# Patient Record
Sex: FEMALE | Race: WHITE | URBAN - METROPOLITAN AREA
[De-identification: names, ages, dates, MRNs, and addresses within clinical notes are randomized per-mention and may not be internally consistent; named-entity substitution may affect disease eponyms.]

---

## 2017-05-26 ENCOUNTER — TRANSFERRED RECORDS (OUTPATIENT)
Dept: HEALTH INFORMATION MANAGEMENT | Facility: CLINIC | Age: 82
End: 2017-05-26

## 2017-05-30 ENCOUNTER — NURSING HOME VISIT (OUTPATIENT)
Dept: GERIATRICS | Facility: CLINIC | Age: 82
End: 2017-05-30
Payer: COMMERCIAL

## 2017-05-30 VITALS
OXYGEN SATURATION: 97 % | RESPIRATION RATE: 16 BRPM | BODY MASS INDEX: 25.52 KG/M2 | DIASTOLIC BLOOD PRESSURE: 90 MMHG | HEIGHT: 65 IN | TEMPERATURE: 97.4 F | SYSTOLIC BLOOD PRESSURE: 125 MMHG | HEART RATE: 80 BPM | WEIGHT: 153.2 LBS

## 2017-05-30 DIAGNOSIS — D64.9 ANEMIA, UNSPECIFIED TYPE: ICD-10-CM

## 2017-05-30 DIAGNOSIS — I25.10 CORONARY ARTERY DISEASE INVOLVING NATIVE CORONARY ARTERY OF NATIVE HEART WITHOUT ANGINA PECTORIS: ICD-10-CM

## 2017-05-30 DIAGNOSIS — I50.32 CHRONIC DIASTOLIC CONGESTIVE HEART FAILURE (H): ICD-10-CM

## 2017-05-30 DIAGNOSIS — E87.1 HYPONATREMIA: Primary | ICD-10-CM

## 2017-05-30 DIAGNOSIS — I10 ESSENTIAL HYPERTENSION: ICD-10-CM

## 2017-05-30 DIAGNOSIS — R41.89 COGNITIVE IMPAIRMENT: ICD-10-CM

## 2017-05-30 DIAGNOSIS — Z86.73 HISTORY OF CVA (CEREBROVASCULAR ACCIDENT): ICD-10-CM

## 2017-05-30 DIAGNOSIS — R53.81 PHYSICAL DECONDITIONING: ICD-10-CM

## 2017-05-30 PROCEDURE — 99207 ZZC CDG-CORRECTLY CODED, REVIEWED AND AGREE: CPT | Performed by: NURSE PRACTITIONER

## 2017-05-30 PROCEDURE — 99310 SBSQ NF CARE HIGH MDM 45: CPT | Performed by: NURSE PRACTITIONER

## 2017-05-30 RX ORDER — ALBUTEROL SULFATE 1.25 MG/3ML
1 SOLUTION RESPIRATORY (INHALATION) EVERY 6 HOURS PRN
COMMUNITY
End: 2017-06-14

## 2017-05-30 RX ORDER — ASPIRIN 81 MG/1
81 TABLET, CHEWABLE ORAL DAILY
COMMUNITY

## 2017-05-30 RX ORDER — BENZOCAINE/MENTHOL 6 MG-10 MG
LOZENGE MUCOUS MEMBRANE 2 TIMES DAILY
COMMUNITY
End: 2017-05-31

## 2017-05-30 RX ORDER — FLUTICASONE PROPIONATE 50 MCG
2 SPRAY, SUSPENSION (ML) NASAL DAILY
COMMUNITY
End: 2017-06-01

## 2017-05-30 RX ORDER — POTASSIUM CHLORIDE 750 MG/1
10 CAPSULE, EXTENDED RELEASE ORAL DAILY
COMMUNITY

## 2017-05-30 RX ORDER — POLYETHYLENE GLYCOL 1450
1 POWDER (GRAM) MISCELLANEOUS DAILY PRN
COMMUNITY
End: 2017-06-14

## 2017-05-30 RX ORDER — ASCORBIC ACID 500 MG
500 TABLET ORAL DAILY
COMMUNITY

## 2017-05-30 RX ORDER — FERROUS GLUCONATE 324(38)MG
324 TABLET ORAL
COMMUNITY

## 2017-05-30 NOTE — PROGRESS NOTES
[facility]  PRIMARY CARE PROVIDER AND CLINIC:  [facility] [address] / [facility] [address]  Chief Complaint   Patient presents with     Hospital F/U       HPI:    Pebbles Moulton is a 82 year old  (1935),admitted to the [facility] from [facility].  Hospital stay 05/26/2017 through 05/29/2017.  Admitted to this facility for  rehab, medical management and nursing care.    She has a history of CHF and CAD with stent placement earlier this month and was hospitalized from a different tcu with the feeling that a pill was stuck in her throat. No choking episode reported. CT scan was negative for neck abnormalities. In the ED, Na level was 111, down from 138 on 5/22/2017. Her daughter had reported that they were pushing fluids to make sure she was hydrated. Cardiology consulted and IV lasix was given. Fluid restriction started. Na improved to 132 at discharge. IV lasix was transitioned to oral. Hgb was 7.5 at discharge.   Recent ECHO showed EF 20% with diffuse hypokinesis.     She is a fair historian. Message left for her daughter Parul Preston.   For some reason, care everywhere is unavailable.       Current issues are:         Hyponatremia-reports fatigue, otherwise feeling well. Reports an occasional dry cough. The feeling that a pill is stuck in her throat resolved inpatient.   Chronic diastolic congestive heart failure (H)-denies shortness of breath or chest pain.   Anemia, unspecified type-reports she was scheduled for a colonoscopy but it was cancelled due to cardiac issues.   Coronary artery disease involving native coronary artery of native heart without angina pectoris  History of CVA (cerebrovascular accident)-records indicate multiple CVAs/TIA  Essential HTN-BPs: 125/90, 117/69, 149/76   HR: 78-83  Cognitive impairment-fair historian re: medical history. No services at home, manages her own meds.   Physical deconditioning-ambulating short  distances with walker and assist. Requires assist of 1 with cares.     CODE STATUS/ADVANCE DIRECTIVES DISCUSSION:   CPR/Full code   Patient's living condition: lives alone [facility]    ALLERGIES:Review of patient's allergies indicates no known allergies.  PAST MEDICAL HISTORY:  has a past medical history of Anemia; CAD (coronary artery disease); CHF (congestive heart failure) (H); CVA (cerebral vascular accident) (H); Essential hypertension; Ischemic cardiomyopathy; and TIA (transient ischemic attack).  PAST SURGICAL HISTORY:  has no past surgical history on file.  FAMILY HISTORY: family history is not on file.  SOCIAL HISTORY:      Post Discharge Medication Reconciliation Status: discharge medications reconciled, continue medications without change.  Current Outpatient Prescriptions   Medication Sig Dispense Refill     ACETAMINOPHEN PO Take 325-650 mg by mouth every 4 hours as needed for pain       albuterol (ACCUNEB) 1.25 MG/3ML nebulizer solution Take 1 vial by nebulization every 6 hours as needed for shortness of breath / dyspnea or wheezing       ascorbic acid 500 MG TABS Take 500 mg by mouth daily       aspirin 81 MG chewable tablet Take 81 mg by mouth daily       ATORVASTATIN CALCIUM PO Take 40 mg by mouth every evening       CARVEDILOL PO Take 6.25 mg by mouth 2 times daily (with meals)       CLOPIDOGREL BISULFATE PO Take 75 mg by mouth daily       ferrous gluconate (FERGON) 324 (38 FE) MG tablet Take 324 mg by mouth 3 times daily (with meals)       fluticasone (FLONASE) 50 MCG/ACT spray Spray 2 sprays into both nostrils daily       guaiFENesin (ROBITUSSIN) 20 mg/mL SOLN solution Take 20 mLs by mouth every 4 hours as needed for cough Until 5/30 at 23:59       hydrocortisone (CORTAID) 1 % cream Apply topically 2 times daily Apply to affected area.       FUROSEMIDE PO Take 40 mg by mouth daily       LISINOPRIL PO Take 2.5 mg by mouth daily       OMEPRAZOLE PO Take 20 mg by mouth daily 1 hour before meal.    "    Polyethylene Glycol 1450 POWD Take 1 packet by mouth daily       potassium chloride (MICRO-K) 10 MEQ CR capsule Take 10 mEq by mouth 2 times daily         ROS:  10 point ROS of systems including Constitutional, Eyes, Respiratory, Cardiovascular, Gastroenterology, Genitourinary, Integumentary, Muscularskeletal, Psychiatric were all negative except for pertinent positives noted in my HPI.    Exam:  /90  Pulse 80  Temp 97.4  F (36.3  C)  Resp 16  Ht 5' 5\" (1.651 m)  Wt 153 lb 3.2 oz (69.5 kg)  SpO2 97%  BMI 25.49 kg/m2  GENERAL APPEARANCE:  Alert, in no distress  ENT:  Mouth and posterior oropharynx normal, moist mucous membranes, normal hearing acuity  EYES:  EOM normal, conjunctiva and lids normal, PERRL  NECK:  No adenopathy,masses or thyromegaly  RESP:  respiratory effort and palpation of chest normal, lungs clear to auscultation , no respiratory distress  CV:  Palpation and auscultation of heart done , regular rate and rhythm, no murmur, rub, or gallop, no edema, +2 pedal pulses  ABDOMEN:  normal bowel sounds, soft, nontender, no hepatosplenomegaly or other masses  M/S:   gait steady with walker. FAGAN with good strength. No joint inflammation  SKIN:  no rashes or open areas  PSYCH:  oriented X 3, memory impaired     Lab/Diagnostic data:  Labs from [facility]    5/29/17:  Creatinine 0.83  Glucose 115  BICARB 28  Chloride 98  K  4.0  Sodium 132  BUN  15  CA  8.9  GFR  >60    5/28/17:  Sodium 129  Potassium 3.8  BUN  15  Creatinine 0.91    5/27/17:  Sodium 128  Potassium 3.6  BUN  <10  Creatinine 0.81    5/26/17:  Creatinine 0.81  Glucose 146  GLWB  127  BICARB 22  Chloride 84  K  4.0  Sodium 111  BUN  14  CA  8.1  GFR  >60  Hgb  7.5  Platelet 262    ASSESSMENT / PLAN:  (E87.1) Hyponatremia  (primary encounter diagnosis)  (I50.32) Chronic diastolic congestive heart failure (H)  Comment: appears compensated. Excessive free water intake contributed to hyponatremia   Plan: continue lasix. Daily " weight. BMP. Message left for daughter Parul Preston.     (D64.9) Anemia, unspecified type  Comment: etiology unclear and no records available. Appears she was scheduled for colonoscopy, which was cancelled.   Plan: Hgb tomorrow. Continue ferrous gluconate. Discuss with daughter.     (I25.10) CAD  Comment: s/p recent stent placement earlier this month.   Plan: continue Plavix, ASA, carvedilol, lisinopril. Cardiology follow up as scheduled.     (Z86.73) History of CVA (cerebrovascular accident)  Comment: history of multiple CVAs/TIAs  Plan: continue ASA. Therapies, including cognitive testing.     (I10) Essential hypertension  Comment: controlled  Plan: continue carvedilol, lisinopril, lasix. Monitor VS. Follow BMP    (R41.89) Cognitive impairment  Comment: appears to have mild deficits, although she may not be back to her baseline  Plan: cognitive testing per therapies.     (R53.81) Physical deconditioning  Comment: related to recurrent hospitalizations and multiple comorbidities  Plan: PHYSICAL THERAPY/OT. Her goal is to return to her apt.           Information reviewed:  Medications, vital signs, orders, nursing notes, problem list, hospital information.     Electronically signed by:  MICHAEL Herrera CNP

## 2017-05-31 ENCOUNTER — TRANSFERRED RECORDS (OUTPATIENT)
Dept: HEALTH INFORMATION MANAGEMENT | Facility: CLINIC | Age: 82
End: 2017-05-31

## 2017-05-31 PROBLEM — E87.1 HYPONATREMIA: Status: ACTIVE | Noted: 2017-05-31

## 2017-05-31 PROBLEM — R53.81 PHYSICAL DECONDITIONING: Status: ACTIVE | Noted: 2017-05-31

## 2017-05-31 PROBLEM — Z86.73 HISTORY OF CVA (CEREBROVASCULAR ACCIDENT): Status: ACTIVE | Noted: 2017-05-31

## 2017-05-31 PROBLEM — R41.89 COGNITIVE IMPAIRMENT: Status: ACTIVE | Noted: 2017-05-31

## 2017-05-31 LAB
ANION GAP SERPL CALCULATED.3IONS-SCNC: 7 MMOL/L (ref 3–14)
BUN SERPL-MCNC: 20 MG/DL (ref 7–30)
CALCIUM SERPL-MCNC: 8.8 MG/DL (ref 8.5–10.1)
CHLORIDE SERPLBLD-SCNC: 101 MMOL/L (ref 94–109)
CO2 SERPL-SCNC: 28 MMOL/L (ref 20–32)
CREAT SERPL-MCNC: 0.92 MG/DL (ref 0.52–1.04)
GFR SERPL CREATININE-BSD FRML MDRD: 59 ML/MIN/1.73M2
GLUCOSE SERPL-MCNC: 104 MG/DL (ref 70–99)
HEMOGLOBIN: 7.1 G/DL (ref 11.7–15.7)
POTASSIUM SERPL-SCNC: 4.3 MMOL/L (ref 3.4–5.3)
SODIUM SERPL-SCNC: 136 MMOL/L (ref 133–144)

## 2017-06-01 ENCOUNTER — NURSING HOME VISIT (OUTPATIENT)
Dept: GERIATRICS | Facility: CLINIC | Age: 82
End: 2017-06-01
Payer: COMMERCIAL

## 2017-06-01 VITALS
DIASTOLIC BLOOD PRESSURE: 70 MMHG | SYSTOLIC BLOOD PRESSURE: 135 MMHG | OXYGEN SATURATION: 100 % | TEMPERATURE: 97 F | RESPIRATION RATE: 16 BRPM | HEART RATE: 77 BPM

## 2017-06-01 VITALS
HEART RATE: 77 BPM | BODY MASS INDEX: 24.06 KG/M2 | RESPIRATION RATE: 16 BRPM | SYSTOLIC BLOOD PRESSURE: 135 MMHG | TEMPERATURE: 97 F | DIASTOLIC BLOOD PRESSURE: 70 MMHG | WEIGHT: 144.6 LBS | OXYGEN SATURATION: 100 %

## 2017-06-01 DIAGNOSIS — E87.1 HYPONATREMIA: ICD-10-CM

## 2017-06-01 DIAGNOSIS — D64.9 ANEMIA, UNSPECIFIED TYPE: Primary | ICD-10-CM

## 2017-06-01 DIAGNOSIS — I10 ESSENTIAL HYPERTENSION: ICD-10-CM

## 2017-06-01 DIAGNOSIS — I25.5 ISCHEMIC CARDIOMYOPATHY: ICD-10-CM

## 2017-06-01 DIAGNOSIS — Z71.89 ADVANCED DIRECTIVES, COUNSELING/DISCUSSION: ICD-10-CM

## 2017-06-01 DIAGNOSIS — R41.89 COGNITIVE IMPAIRMENT: ICD-10-CM

## 2017-06-01 DIAGNOSIS — R53.81 PHYSICAL DECONDITIONING: ICD-10-CM

## 2017-06-01 DIAGNOSIS — I50.32 CHRONIC DIASTOLIC CONGESTIVE HEART FAILURE (H): ICD-10-CM

## 2017-06-01 PROCEDURE — 99309 SBSQ NF CARE MODERATE MDM 30: CPT | Performed by: NURSE PRACTITIONER

## 2017-06-01 RX ORDER — AMOXICILLIN 250 MG
2 CAPSULE ORAL AT BEDTIME
COMMUNITY
End: 2017-06-14

## 2017-06-01 NOTE — PROGRESS NOTES
[facility]    Chief Complaint   Patient presents with     RECHECK       HPI:    Pebbles Moulton is a 82 year old  (1935), who is being seen today for an episodic care visit at [facility].  HPI information obtained from: facility chart records, facility staff, patient report and Care Everywhere Epic chart review.  She came to this facility 5/29/2017 for short term rehab and medical management following hospitalization for hyponatremia, in the setting of CHF and excessive water ingestion. She was in the ED for evaluation of feeling that a pill was stuck in her throat and was found to have Na 111. Cardiology consulted and IV lasix given. Fluid restriction started. Na 132 at discharge. IV lasix transitioned to oral. Hgb 7.5 at discharge.   She was also hospitalized at [facility] earlier this month for CAD and underwent stent placement. ECHO: EF 20% with diffuse hypokinesis.       Today's concerns are:      Anemia, unspecified type-Hgb dropped to 7.1 yesterday, today is 7.9. Denies signs of bleeding. Had 1 negative hemoccult today. Denies feeling dizzy or lightheaded.   Hyponatremia-Na   Chronic diastolic congestive heart failure (H)-recorded weight is down 9 lbs in 2 days, so probably not accurate. Good appetite.  Denies cough, shortness of breath or chest pain.   Ischemic cardiomyopathy  Essential hypertension-BPs: 135/70, 144/73, 113/63   HR: 68-83  Cognitive impairment-speech therapist indicates fairly good memory, but deficits with higher level tasks. CPT is in progress.   Physical deconditioning-ambulating short distances with walker and assist. Requires assist of 1 with cares.     Her daughter Parul Preston is here and reports patient's functional status and cognition have not been back to baseline since hospitalization earlier this month. [place] in [place] and able to manage her own meds. Patient was anemic prior to having stent placed and colonoscopy was scheduled, but  cancelled. Family feels that a colonoscopy would be too hard for patient at this time, daughter asks if anything less invasive can be done.     Per care everywhere records:  She was seen in the [facility] ED 4/22/2017 with rectal bleeding after having an enema the day  before for constipation. Hgb ws 9.4 at the time, baseline Hgb around 10.5.   Hospitalized 5/1-5/8/2017 for CAD with angina and underwent stent placement. EF 20%. The day after the procedure,  5/2/2017, she was noted to have right arm weakness, likely due to small stroke. She has a history of multiple small acute and subacute infarcts with residual right sided weakness. Hgb was 7.5 and work up was consistent with iron deficiency. Iron supplement was started and follow up with PCP recommended.         ALLERGIES: Review of patient's allergies indicates no known allergies.  Past Medical, Surgical, Family and Social History reviewed and updated in [facility].    Current Outpatient Prescriptions   Medication Sig Dispense Refill     senna-docusate (SENOKOT-S;PERICOLACE) 8.6-50 MG per tablet Take 2 tablets by mouth At Bedtime Hold for loose stools        ACETAMINOPHEN PO Take 650 mg by mouth every 4 hours as needed for pain        albuterol (ACCUNEB) 1.25 MG/3ML nebulizer solution Take 1 vial by nebulization every 6 hours as needed for shortness of breath / dyspnea or wheezing       ascorbic acid 500 MG TABS Take 500 mg by mouth daily       aspirin 81 MG chewable tablet Take 81 mg by mouth daily       ATORVASTATIN CALCIUM PO Take 40 mg by mouth every evening       CARVEDILOL PO Take 6.25 mg by mouth 2 times daily (with meals)       CLOPIDOGREL BISULFATE PO Take 75 mg by mouth daily       ferrous gluconate (FERGON) 324 (38 FE) MG tablet Take 324 mg by mouth 3 times daily (with meals)       guaiFENesin (ROBITUSSIN) 20 mg/mL SOLN solution Take 10 mLs by mouth every 4 hours as needed for cough Until 5/30 at 23:59        FUROSEMIDE PO Take 40 mg by mouth daily        LISINOPRIL PO Take 2.5 mg by mouth daily       OMEPRAZOLE PO Take 20 mg by mouth daily 1 hour before meal.       Polyethylene Glycol 1450 POWD Take 1 packet by mouth daily       potassium chloride (MICRO-K) 10 MEQ CR capsule Take 10 mEq by mouth 2 times daily       Medications reviewed:  Medications reconciled to facility chart and changes were made to reflect current medications as identified as above med list. Below are the changes that were made:   Medications stopped since last EPIC medication reconciliation:   Medications Discontinued During This Encounter   Medication Reason     fluticasone (FLONASE) 50 MCG/ACT spray Medication Reconciliation Clean Up       Medications started since last [facility] medication reconciliation:  Orders Placed This Encounter   Medications     senna-docusate (SENOKOT-S;RALEIGH COLACE) 8.6-50 MG per tablet     Sig: Take 1 tablet by mouth At Bedtime Hold for loose stools     REVIEW OF SYSTEMS:  10 point ROS of systems including Constitutional, Eyes, Respiratory, Cardiovascular, Gastroenterology, Genitourinary, Integumentary, Muscularskeletal, Psychiatric were all negative except for pertinent positives noted in my HPI.    Physical Exam:  /70  Pulse 77  Temp 97  F (36.1  C)  Resp 16  SpO2 100%  GENERAL APPEARANCE:  Alert, in no distress  ENT:  Mouth and posterior oropharynx normal, moist mucous membranes, normal hearing acuity  EYES:  EOM normal, conjunctiva and lids normal  NECK:  No adenopathy,masses or thyromegaly  RESP:  respiratory effort and palpation of chest normal, lungs clear to auscultation , no respiratory distress  CV:  Palpation and auscultation of heart done , regular rate and rhythm, no murmur, no edema, +2 pedal pulses  ABDOMEN:  soft, nontender, no hepatosplenomegaly or other masses  M/S:   gait steady with walker. FAGAN with good strength. No joint inflammation  SKIN:  no rashes or open areas  PSYCH:  oriented X 3, memory impaired     Recent Labs:    6/1/2017: Hgb  7.9    Last Basic Metabolic Panel:  Lab Results   Component Value Date     05/31/2017      Lab Results   Component Value Date    POTASSIUM 4.3 05/31/2017     Lab Results   Component Value Date    CHLORIDE 101 05/31/2017     Lab Results   Component Value Date    SAVANNAH 8.8 05/31/2017     Lab Results   Component Value Date    CO2 28 05/31/2017     Lab Results   Component Value Date    BUN 20 05/31/2017     Lab Results   Component Value Date    CR 0.92 05/31/2017     Lab Results   Component Value Date     05/31/2017     Lab Results   Component Value Date    HGB 7.1 05/31/2017       ASSESSMENT / PLAN:  (D.9) Anemia, unspecified type  (primary encounter diagnosis)  Comment: Hgb improved. No signs of active bleeding. Daughter does not want colonoscopy rescheduled at this time.   Plan: continue ferrous gluconate (consider reducing dose to bid). Recheck Hgb 6/5/2017 . Iron studies pending. Hemoccult stools. Refer to GI if needed.     (E.1) Hyponatremia  Comment: improved.   Plan: BMP 6/5/2017. Consider increasing amount of fluid restriction-will discuss with dietician.     (I.32) Chronic diastolic congestive heart failure (H)  (I.5) Ischemic cardiomyopathy  CAD, S/pa recent stent  Comment: appears compensated with no acute issues  Plan: continue lasix, Plavix, ASA, carvedilol, lisinopril. Daily weight. Cardiology follow up next week.     (I) Essential hypertension  Comment: controlled. Renal function at baseline  Plan: continue carvedilol, lisinopril, lasix. Monitor VS. Follow BMP    (R.89) Cognitive impairment  Comment: appears to have mild to moderate deficits. She is not back to baseline, per daughter  Plan: cognitive testing in progress.     (R.81) Physical deconditioning  Comment: progressing in therapies  Plan: continue PHYSICAL THERAPY/OT. [place], but she may need services. Family aware.     (EZ.89) Advanced directives  Comment: daughter is health care agent and confirms Full Code  Plan:  POLST completed and [facility] orders updated.       Electronically signed by  MICHAEL Herrera CNP

## 2017-06-02 ENCOUNTER — NURSING HOME VISIT (OUTPATIENT)
Dept: GERIATRICS | Facility: CLINIC | Age: 82
End: 2017-06-02

## 2017-06-02 DIAGNOSIS — Z53.9 ERRONEOUS ENCOUNTER--DISREGARD: Primary | ICD-10-CM

## 2017-06-02 PROBLEM — Z71.89 ADVANCED DIRECTIVES, COUNSELING/DISCUSSION: Status: ACTIVE | Noted: 2017-06-02

## 2017-06-05 ENCOUNTER — TRANSFERRED RECORDS (OUTPATIENT)
Dept: HEALTH INFORMATION MANAGEMENT | Facility: CLINIC | Age: 82
End: 2017-06-05

## 2017-06-05 LAB
ANION GAP SERPL CALCULATED.3IONS-SCNC: 8 MMOL/L (ref 3–14)
BUN SERPL-MCNC: 16 MG/DL (ref 7–30)
CALCIUM SERPL-MCNC: 8.8 MG/DL (ref 8.5–10.1)
CHLORIDE SERPLBLD-SCNC: 103 MMOL/L (ref 94–109)
CO2 SERPL-SCNC: 26 MMOL/L (ref 20–32)
CREAT SERPL-MCNC: ABNORMAL MG/DL
GFR SERPL CREATININE-BSD FRML MDRD: 61 ML/MIN/1.73M2
GLUCOSE SERPL-MCNC: 119 MG/DL (ref 70–99)
HEMOGLOBIN: 7.7 G/DL (ref 11.7–15.7)
POTASSIUM SERPL-SCNC: 3.9 MMOL/L (ref 3.5–5.3)
SODIUM SERPL-SCNC: 137 MMOL/L (ref 133–144)

## 2017-06-06 ENCOUNTER — NURSING HOME VISIT (OUTPATIENT)
Dept: GERIATRICS | Facility: CLINIC | Age: 82
End: 2017-06-06
Payer: COMMERCIAL

## 2017-06-06 VITALS
RESPIRATION RATE: 16 BRPM | WEIGHT: 143.4 LBS | BODY MASS INDEX: 23.86 KG/M2 | TEMPERATURE: 98 F | OXYGEN SATURATION: 100 % | HEART RATE: 80 BPM | SYSTOLIC BLOOD PRESSURE: 119 MMHG | DIASTOLIC BLOOD PRESSURE: 69 MMHG

## 2017-06-06 DIAGNOSIS — R05.9 COUGH: ICD-10-CM

## 2017-06-06 DIAGNOSIS — R53.81 PHYSICAL DECONDITIONING: ICD-10-CM

## 2017-06-06 DIAGNOSIS — R41.89 COGNITIVE IMPAIRMENT: ICD-10-CM

## 2017-06-06 DIAGNOSIS — E87.1 HYPONATREMIA: Primary | ICD-10-CM

## 2017-06-06 DIAGNOSIS — I10 ESSENTIAL HYPERTENSION: ICD-10-CM

## 2017-06-06 DIAGNOSIS — I50.22 CHRONIC SYSTOLIC CONGESTIVE HEART FAILURE (H): ICD-10-CM

## 2017-06-06 DIAGNOSIS — D64.9 ANEMIA, UNSPECIFIED TYPE: ICD-10-CM

## 2017-06-06 PROCEDURE — 99207 ZZC CDG-CORRECTLY CODED, REVIEWED AND AGREE: CPT | Performed by: INTERNAL MEDICINE

## 2017-06-06 PROCEDURE — 99306 1ST NF CARE HIGH MDM 50: CPT | Performed by: INTERNAL MEDICINE

## 2017-06-12 ENCOUNTER — TRANSFERRED RECORDS (OUTPATIENT)
Dept: HEALTH INFORMATION MANAGEMENT | Facility: CLINIC | Age: 82
End: 2017-06-12

## 2017-06-12 LAB
ANION GAP SERPL CALCULATED.3IONS-SCNC: 9 MMOL/L (ref 3–14)
BUN SERPL-MCNC: 16 MG/DL (ref 7–30)
CALCIUM SERPL-MCNC: 8.9 MG/DL (ref 8.5–10.1)
CHLORIDE SERPLBLD-SCNC: 105 MMOL/L (ref 94–109)
CO2 SERPL-SCNC: 25 MMOL/L (ref 20–32)
CREAT SERPL-MCNC: 0.84 MG/DL (ref 0.52–1.04)
GFR SERPL CREATININE-BSD FRML MDRD: 65 ML/MIN/1.73M2
GLUCOSE SERPL-MCNC: 106 MG/DL (ref 70–99)
POTASSIUM SERPL-SCNC: 3.6 MMOL/L (ref 3.4–5.3)
SODIUM SERPL-SCNC: 139 MMOL/L (ref 133–144)

## 2017-06-14 ENCOUNTER — DISCHARGE SUMMARY NURSING HOME (OUTPATIENT)
Dept: GERIATRICS | Facility: CLINIC | Age: 82
End: 2017-06-14
Payer: COMMERCIAL

## 2017-06-14 VITALS
DIASTOLIC BLOOD PRESSURE: 68 MMHG | HEART RATE: 96 BPM | TEMPERATURE: 97.2 F | OXYGEN SATURATION: 99 % | SYSTOLIC BLOOD PRESSURE: 121 MMHG | BODY MASS INDEX: 23.7 KG/M2 | WEIGHT: 142.4 LBS | RESPIRATION RATE: 16 BRPM

## 2017-06-14 DIAGNOSIS — R41.89 COGNITIVE IMPAIRMENT: ICD-10-CM

## 2017-06-14 DIAGNOSIS — E87.1 HYPONATREMIA: Primary | ICD-10-CM

## 2017-06-14 DIAGNOSIS — Z86.73 HISTORY OF CVA (CEREBROVASCULAR ACCIDENT): ICD-10-CM

## 2017-06-14 DIAGNOSIS — I25.5 ISCHEMIC CARDIOMYOPATHY: ICD-10-CM

## 2017-06-14 DIAGNOSIS — R53.81 PHYSICAL DECONDITIONING: ICD-10-CM

## 2017-06-14 DIAGNOSIS — I10 ESSENTIAL HYPERTENSION: ICD-10-CM

## 2017-06-14 DIAGNOSIS — I25.10 CORONARY ARTERY DISEASE INVOLVING NATIVE CORONARY ARTERY OF NATIVE HEART WITHOUT ANGINA PECTORIS: ICD-10-CM

## 2017-06-14 DIAGNOSIS — I50.22 CHRONIC SYSTOLIC CONGESTIVE HEART FAILURE (H): ICD-10-CM

## 2017-06-14 DIAGNOSIS — D64.9 ANEMIA, UNSPECIFIED TYPE: ICD-10-CM

## 2017-06-14 PROCEDURE — 99316 NF DSCHRG MGMT 30 MIN+: CPT | Performed by: NURSE PRACTITIONER

## 2017-06-14 PROCEDURE — 99207 ZZC CDG-CORRECTLY CODED, REVIEWED AND AGREE: CPT | Performed by: NURSE PRACTITIONER

## 2017-06-14 NOTE — PROGRESS NOTES
[facility] DISCHARGE SUMMARY    PATIENT'S NAME: Pebbles Moulton  YOB: 1935  MEDICAL RECORD NUMBER:  [id number]    PRIMARY CARE PROVIDER AND CLINIC [facility] [address] [facility] [place]    CODE STATUS/ADVANCE DIRECTIVES DISCUSSION:   CPR/Full code      No Known Allergies    TRANSFERRING PROVIDERS: MICHAEL Herrera CNP, Janelle Vargas MD  DATE OF SNF ADMISSION:  May / 29 / 2017  DATE OF SNF (anticipated) DISCHARGE: Mila / 15 / 2017  DISCHARGE DISPOSITION: Non-FMG Provider   Nursing Facility: [facility]  [facility] stay 05/26/2017 to 05/29/2017.     Condition on Discharge:  Improving.  Cognitive Scores: SLUMS 21/30 and CPT 5.0/5.6    Equipment: walker    DISCHARGE DIAGNOSIS:   1. Hyponatremia    2. Anemia, unspecified type    3. Chronic systolic congestive heart failure (H)    4. Ischemic cardiomyopathy    5. Coronary artery disease involving native coronary artery of native heart without angina pectoris    6. History of CVA (cerebrovascular accident)    7. Essential hypertension    8. Cognitive impairment    9. Physical deconditioning        HPI Nursing Facility Course:  HPI information obtained from: facility chart records, facility staff, patient report and [facility] chart review.  She came to this facility  for short term rehab and medical management following hospitalization for hyponatremia, in the setting of CHF and excessive water ingestion. She was in the ED for evaluation of feeling that a pill was stuck in her throat and was found to have Na 111. Cardiology consulted and IV lasix given. Fluid restriction started. Na 132 at discharge. IV lasix transitioned to oral. Hgb 7.5 at discharge.   She was also hospitalized at [facility] earlier this month for CAD and underwent stent placement. ECHO: EF 20% with diffuse hypokinesis.     She has worked with PHYSICAL THERAPY and OT with good  results. Ambulating 300 ft with walker and supervision. Requires stand by assistance with bathing. Independent with toileting and dressing.   ASSESSMENT / PLAN:  (E87.1) Hyponatremia  (primary encounter diagnosis)  Comment: due to excess water ingestion, resolved. Fluid restriction has been relaxed to 1800 ml.   Plan: continue fluid restriction, follow up labs per PCP. Discharge home 6/15/2017. Home services and follow up appts as below.     (D64.9) Anemia, unspecified type  Comment: anemia since at least 4/2017 when she was evaluated in the [facility] ED for rectal bleeding. Work up done inpatient was consistent with iron deficiency. Colonoscopy was scheduled but cancelled by family. Hgb has improved from 7.1 to 8.5. Hemoccults negative  Plan: continue daily ferrous gluconate. Follow up labs per PCP.     (I50.22) Chronic systolic congestive heart failure (H)  (I25.5) Ischemic cardiomyopathy  (I25.10) Coronary artery disease involving native coronary artery of native heart without angina pectoris  Comment: no acute issues, fluid status compensated. Most recent Cardiology appointment was 6/14/2017.   Plan: continue ASA, statin, Plavix, carvedilol, losartan, lasix. Follow up Cardiology as scheduled.     (Z86.73) History of CVA (cerebrovascular accident)  Comment: mild right foot drag has improved with therapies. She had Neurology follow up this week.   Plan: continue ASA. Follow up [facility] Neurology as scheduled.     (I10) Essential hypertension  Comment: controlled with BPs: 121/68, 122/74, 117/74   HR: 81-96  Plan: continue losartan, lasix, carvedilol. Follow up with PCP    (R41.89) Cognitive impairment  Comment: mild deficits. Family very involved and assists with med set up.   Plan: as above    (R53.81) Physical deconditioning  Comment: progress in therapies as above  Plan: home therapies for ongoing strengthening, gait training and ADL safety      PAST MEDICAL HISTORY:  has a past medical history of Anemia; CAD  (coronary artery disease); CHF (congestive heart failure) (H); CVA (cerebral vascular accident) (H); Essential hypertension; Ischemic cardiomyopathy; and TIA (transient ischemic attack).    DISCHARGE MEDICATIONS:  Current Outpatient Prescriptions   Medication Sig Dispense Refill     LOSARTAN POTASSIUM PO Take 12.5 mg by mouth daily       ACETAMINOPHEN PO Take 650 mg by mouth every 4 hours as needed for pain        ascorbic acid 500 MG TABS Take 500 mg by mouth daily       aspirin 81 MG chewable tablet Take 81 mg by mouth daily       ATORVASTATIN CALCIUM PO Take 40 mg by mouth every evening       CARVEDILOL PO Take 6.25 mg by mouth 2 times daily (with meals)       CLOPIDOGREL BISULFATE PO Take 75 mg by mouth daily       ferrous gluconate (FERGON) 324 (38 FE) MG tablet Take 324 mg by mouth daily (with breakfast)        guaiFENesin (ROBITUSSIN) 20 mg/mL SOLN solution Take 10 mLs by mouth 4 times daily as needed for cough Until 5/30 at 23:59        FUROSEMIDE PO Take 40 mg by mouth daily       OMEPRAZOLE PO Take 20 mg by mouth daily 1 hour before meal.       potassium chloride (MICRO-K) 10 MEQ CR capsule Take 10 mEq by mouth daily          MEDICATION CHANGES/RATIONALE:   Ferrous gluconate decreased from tid to daily.   Lisinopril discontinued and losartan started per Cardiology due to cough.   Albuterol neb, miralax and senna discontinued-not used.   Controlled medications sent with patient: not applicable/none     ROS:    10 point ROS of systems including Constitutional, Eyes, Respiratory, Cardiovascular, Gastroenterology, Genitourinary, Integumentary, Muscularskeletal, Psychiatric were all negative except for pertinent positives noted in my HPI.    Physical Exam:   Vitals: /68  Pulse 96  Temp 97.2  F (36.2  C)  Resp 16  Wt 142 lb 6.4 oz (64.6 kg)  SpO2 99%  BMI 23.7 kg/m2  BMI= Body mass index is 23.7 kg/(m^2).    GENERAL APPEARANCE:  Alert, in no distress  RESP:  respiratory effort and palpation of chest  normal, lungs clear to auscultation , no respiratory distress  CV:  Palpation and auscultation of heart done , regular rate and rhythm, no murmur, no edema, +2 pedal pulses  ABDOMEN:  soft, nontender, no hepatosplenomegaly or other masses  M/S:   gait steady with walker, mild right foot drag, FAGAN with good strength. No joint inflammation  SKIN:  no rashes or open areas  PSYCH:  oriented X 3, memory impaired, mood and affect normal    DISCHARGE PLAN:  Occupational Therapy, Physical Therapy, Registered Nurse, Home Health Aide and From:  [facility]  Patient instructed to follow-up with:  PCP in 7 days    Neurology and Cardiology follow up as scheduled.     Current [facility] scheduled appointments:  No future appointments.    MTM referral needed and placed by this provider: No    Pending labs: None  SNF labs   Last Basic Metabolic Panel:  Lab Results   Component Value Date     06/12/2017      Lab Results   Component Value Date    POTASSIUM 3.6 06/12/2017     Lab Results   Component Value Date    CHLORIDE 105 06/12/2017     Lab Results   Component Value Date    SAVANNAH 8.9 06/12/2017     Lab Results   Component Value Date    CO2 25 06/12/2017     Lab Results   Component Value Date    BUN 16 06/12/2017     Lab Results   Component Value Date    CR 0.84 06/12/2017     Lab Results   Component Value Date     06/12/2017     Lab Results   Component Value Date    HGB 8.5 06/12/2017       Discharge Treatments: None    TOTAL DISCHARGE TIME:   Greater than 30 minutes  Electronically signed by:  MICHAEL Herrera CNP

## 2024-06-17 PROBLEM — Z71.89 ADVANCED DIRECTIVES, COUNSELING/DISCUSSION: Status: RESOLVED | Noted: 2017-06-02 | Resolved: 2024-01-01
